# Patient Record
Sex: MALE | Race: WHITE | Employment: FULL TIME | URBAN - METROPOLITAN AREA
[De-identification: names, ages, dates, MRNs, and addresses within clinical notes are randomized per-mention and may not be internally consistent; named-entity substitution may affect disease eponyms.]

---

## 2017-06-01 ENCOUNTER — TRANSFERRED RECORDS (OUTPATIENT)
Dept: HEALTH INFORMATION MANAGEMENT | Facility: CLINIC | Age: 52
End: 2017-06-01

## 2020-04-08 ENCOUNTER — VIRTUAL VISIT (OUTPATIENT)
Dept: FAMILY MEDICINE | Facility: CLINIC | Age: 55
End: 2020-04-08
Payer: COMMERCIAL

## 2020-04-08 DIAGNOSIS — J32.9 CHRONIC SINUSITIS, UNSPECIFIED LOCATION: Primary | ICD-10-CM

## 2020-04-08 PROBLEM — Z47.89 SURGICAL AFTERCARE, MUSCULOSKELETAL SYSTEM: Status: ACTIVE | Noted: 2017-01-03

## 2020-04-08 PROCEDURE — 99213 OFFICE O/P EST LOW 20 MIN: CPT | Mod: TEL | Performed by: FAMILY MEDICINE

## 2020-04-08 RX ORDER — FEXOFENADINE HCL 180 MG/1
180 TABLET ORAL DAILY
Qty: 30 TABLET | Refills: 1 | Status: SHIPPED | OUTPATIENT
Start: 2020-04-08

## 2020-04-08 NOTE — PROGRESS NOTES
"Subjective     Aston Barnett is a 54 year old male who is being evaluated via a billable telephone visit.      The patient has been notified of following:     \"This telephone visit will be conducted via a call between you and your physician/provider. We have found that certain health care needs can be provided without the need for a physical exam.  This service lets us provide the care you need with a short phone conversation.  If a prescription is necessary we can send it directly to your pharmacy.  If lab work is needed we can place an order for that and you can then stop by our lab to have the test done at a later time.    Telephone visits are billed at different rates depending on your insurance coverage. During this emergency period, for some insurers they may be billed the same as an in-person visit.  Please reach out to your insurance provider with any questions.    If during the course of the call the physician/provider feels a telephone visit is not appropriate, you will not be charged for this service.\"    Patient has given verbal consent for Telephone visit?  Yes    Aston Barnett complains of   Chief Complaint   Patient presents with     Sinus Problem     pressure       ALLERGIES  Patient has no known allergies.    Acute Illness   Acute illness concerns: sinus problem  Onset: x3 months    Fever: no    Chills/Sweats: no    Headache (location?): no    Sinus Pressure:YES- facial pain    Conjunctivitis:  no    Ear Pain: YES: right    Rhinorrhea: YES    Congestion: YES    Sore Throat: YES     Cough: YES-non-productive    Wheeze: no    Decreased Appetite: no    Nausea: no    Vomiting: no    Diarrhea:  no    Dysuria/Freq.: no    Fatigue/Achiness: no    Sick/Strep Exposure: no     Therapies Tried and outcome: flonase.    Pt has been having congestion most of the time, in the nose, and into the chest, also clearing his throat all the time, with some sore throat.  He tried steam, or hot shower, that " helps.  Also he has cough that is dry.   Pt is doing flonase twice daily, pt went to [facility], and he was prescribed abx that helped slightly but now it came back.      Patient Active Problem List   Diagnosis     AC separation     Labral tear of shoulder     Surgical aftercare, musculoskeletal system     Osteolysis, right shoulder     History reviewed. No pertinent surgical history.    Social History     Tobacco Use     Smoking status: Never Smoker     Smokeless tobacco: Never Used   Substance Use Topics     Alcohol use: No     Family History   Problem Relation Age of Onset     Family History Negative No family hx of          Current Outpatient Medications   Medication Sig Dispense Refill     triamcinolone (KENALOG) 0.1 % cream Apply  topically 2 times daily. 30 g 0     No Known Allergies  No lab results found.     Reviewed and updated as needed this visit by Provider         Review of Systems   ROS COMP: CONSTITUTIONAL: NEGATIVE for fever, chills, change in weight  RESP: NEGATIVE for significant cough or SOB  CV: NEGATIVE for chest pain, palpitations or peripheral edema       Objective   Reported vitals:  There were no vitals taken for this visit.   healthy, alert and no distress  Psych: Alert and oriented times 3; coherent speech, normal   rate and volume, able to articulate logical thoughts, able   to abstract reason, no tangential thoughts, no hallucinations   or delusions               Assessment/Plan:  1. Chronic sinusitis, unspecified location  Unsure of it is allergic, or bacterial, will start on   - amoxicillin-clavulanate (AUGMENTIN) 875-125 MG tablet; Take 1 tablet by mouth 2 times daily  Dispense: 60 tablet; Refill: 0  - fexofenadine (ALLEGRA) 180 MG tablet; Take 1 tablet (180 mg) by mouth daily  Dispense: 30 tablet; Refill: 1    This was done as a phone visit in order to mitigate transmission of COVID-19 virus during the epidemic time, some of the medical decisions were made due to the  limitations of phone visit (lack of physical exam, inability to do blood tests and so forth)      Phone call duration:  12 minutes    Felix Leonardo MD

## 2020-04-22 ENCOUNTER — VIRTUAL VISIT (OUTPATIENT)
Dept: FAMILY MEDICINE | Facility: CLINIC | Age: 55
End: 2020-04-22
Payer: COMMERCIAL

## 2020-04-22 DIAGNOSIS — J30.9 ALLERGIC SINUSITIS: Primary | ICD-10-CM

## 2020-04-22 PROCEDURE — 99213 OFFICE O/P EST LOW 20 MIN: CPT | Mod: 95 | Performed by: FAMILY MEDICINE

## 2020-04-22 RX ORDER — PREDNISONE 20 MG/1
TABLET ORAL
Qty: 11 TABLET | Refills: 0 | Status: SHIPPED | OUTPATIENT
Start: 2020-04-22 | End: 2020-05-07

## 2020-04-22 NOTE — PROGRESS NOTES
"Aston Barnett is a 54 year old male who is being evaluated via a billable telephone visit.      The patient has been notified of following:     \"This telephone visit will be conducted via a call between you and your physician/provider. We have found that certain health care needs can be provided without the need for a physical exam.  This service lets us provide the care you need with a short phone conversation.  If a prescription is necessary we can send it directly to your pharmacy.  If lab work is needed we can place an order for that and you can then stop by our lab to have the test done at a later time.    Telephone visits are billed at different rates depending on your insurance coverage. During this emergency period, for some insurers they may be billed the same as an in-person visit.  Please reach out to your insurance provider with any questions.    If during the course of the call the physician/provider feels a telephone visit is not appropriate, you will not be charged for this service.\"    Patient has given verbal consent for Telephone visit?  Yes    How would you like to obtain your AVS? MyChart    Subjective     Aston Barnett is a 54 year old male who presents to clinic today for the following health issues:    HPI  Acute Illness   Acute illness concerns: sinus problem  Onset: January 2020    Fever: no    Chills/Sweats: no    Headache (location?): no    Sinus Pressure:YES    Conjunctivitis:  no    Ear Pain: YES- once in a while    Rhinorrhea: no    Congestion: YES- a little     Sore Throat: YES     Cough: YES - mostly dry    Wheeze: no    Decreased Appetite: no    Nausea: no    Vomiting: no    Diarrhea:  no    Dysuria/Freq.: no    Fatigue/Achiness: no    Sick/Strep Exposure: no     Therapies Tried and outcome: Allegra and Flonase    Sinus is just starting to clear, throat is still painful      He has had allergic rhinitis for years and takes allegra     BP Readings from Last 3 " Encounters:   06/08/12 122/62   07/12/07 118/76   09/01/05 116/74    Wt Readings from Last 3 Encounters:   06/08/12 80.4 kg (177 lb 3.2 oz)   07/12/07 82.9 kg (182 lb 12.8 oz)   08/25/05 78.9 kg (174 lb)                    Reviewed and updated as needed this visit by Provider         Review of Systems   ROS COMP: Constitutional, HEENT, cardiovascular, pulmonary, gi and gu systems are negative, except as otherwise noted.       Objective   Reported vitals:  There were no vitals taken for this visit.   healthy, alert and no distress  PSYCH: Alert and oriented times 3; coherent speech, normal   rate and volume, able to articulate logical thoughts, able   to abstract reason, no tangential thoughts, no hallucinations   or delusions  His affect is normal, pleasant and doesn't sound congested   RESP: No cough, no audible wheezing, able to talk in full sentences  Remainder of exam unable to be completed due to telephone visits            Assessment/Plan:  (J30.9) Allergic sinusitis  (primary encounter diagnosis)  Comment: trial of steroid   Plan: predniSONE (DELTASONE) 20 MG tablet        Complete the antibiotics he's on          Phone call duration:  14 minutes    Bimal Bautista MD

## 2020-05-04 ENCOUNTER — TELEPHONE (OUTPATIENT)
Dept: FAMILY MEDICINE | Facility: CLINIC | Age: 55
End: 2020-05-04

## 2020-05-04 NOTE — TELEPHONE ENCOUNTER
"S-(situation): sinus and throat clearing all the time    B-(background):states in [place] it started went to an [facility] was on an abx then did not help then virtual visit with Dr Leonardo on 04/08/2020 where he was prescribed Augmentin and Allegra pt finished that with little improvement.  04/22/2020 virtual visit with Dr Bautista where he was prescribed Prednisone trial.  Pt states that there was NO improvement with that.       A-(assessment): now he states he has sinus drainage throat clearing all the time \"feels like there is phlegm stuck in his throat. Dry cough sometimes he coughs a little phlegm out.  No temp. No exposure to COVID infected individuals.  Unable to look in his throat to see if red since he is color blind and no one around to look at the time.      R-(recommendations): will forward to Dr Bautista for recommendations.  Ching Dimas RN        "

## 2020-05-04 NOTE — TELEPHONE ENCOUNTER
Patient calling stating that he had a phone visit with Dr. Bautista a couple of weeks ago. States that Dr. Bautista told him to call back if not getting better. Patient stated he feels that he is getting worse and wanted to speak to a nurse. Please call patient at [phone], okay to leave message.    Georgina Morley,

## 2020-05-05 NOTE — TELEPHONE ENCOUNTER
"Called pt, informs symptoms present past couple of months, has had cough since January, denies covid19 symptoms other than cough and he has had this and \"its his regular cough\", denies fever, SOB or rash, appointment made, offered urgent care if wants earlier appointment, pt okay to wait for clinic appointment  Ellen Aguero RN, BSN  Message handled by Nurse Triage.    "

## 2020-05-05 NOTE — TELEPHONE ENCOUNTER
Given lack of improvement with antibiotics and steroids, would recommend face to face visit.  If not able to get in, would recommend video visit at the very least.

## 2020-05-07 ENCOUNTER — OFFICE VISIT (OUTPATIENT)
Dept: FAMILY MEDICINE | Facility: CLINIC | Age: 55
End: 2020-05-07
Payer: COMMERCIAL

## 2020-05-07 VITALS
HEART RATE: 79 BPM | WEIGHT: 187.2 LBS | TEMPERATURE: 98 F | BODY MASS INDEX: 26.67 KG/M2 | DIASTOLIC BLOOD PRESSURE: 75 MMHG | SYSTOLIC BLOOD PRESSURE: 114 MMHG | OXYGEN SATURATION: 99 %

## 2020-05-07 DIAGNOSIS — J32.0 CHRONIC MAXILLARY SINUSITIS: Primary | ICD-10-CM

## 2020-05-07 PROCEDURE — 99214 OFFICE O/P EST MOD 30 MIN: CPT | Performed by: PHYSICIAN ASSISTANT

## 2020-05-07 RX ORDER — PREDNISONE 20 MG/1
TABLET ORAL
Qty: 15 TABLET | Refills: 0 | Status: SHIPPED | OUTPATIENT
Start: 2020-05-07

## 2020-05-07 RX ORDER — FLUTICASONE PROPIONATE 50 MCG
1 SPRAY, SUSPENSION (ML) NASAL DAILY
COMMUNITY

## 2020-05-07 RX ORDER — DOXYCYCLINE 100 MG/1
100 CAPSULE ORAL 2 TIMES DAILY
Qty: 28 CAPSULE | Refills: 0 | Status: SHIPPED | OUTPATIENT
Start: 2020-05-07 | End: 2020-05-21

## 2020-05-07 NOTE — PROGRESS NOTES
Subjective     Aston Barnett is a 54 year old male who presents to clinic today for the following health issues:    HPI   Acute Illness   Acute illness concerns: Sinus  Onset: January     Fever: no    Chills/Sweats: no    Headache (location?): no    Sinus Pressure:YES- a little    Conjunctivitis:  no    Ear Pain: YES- once in a great while    Rhinorrhea: no    Congestion: YES- very upper chest    Sore Throat: YES- a little      Cough: YES    Wheeze: no    Decreased Appetite: no    Nausea: no    Vomiting: no    Diarrhea:  no    Dysuria/Freq.: no    Fatigue/Achiness: no    Sick/Strep Exposure: no     Therapies Tried and outcome: Allegra helps a little but the amoxicillin does not help.  Hot showers and activity help. Also taking flonase and took short course of doxy at beginning of April with mild improvement.       Patient states has occurred in the past and took a while to improve.       Patient Active Problem List   Diagnosis     AC separation     Labral tear of shoulder     Surgical aftercare, musculoskeletal system     Osteolysis, right shoulder     History reviewed. No pertinent surgical history.    Social History     Tobacco Use     Smoking status: Never Smoker     Smokeless tobacco: Never Used   Substance Use Topics     Alcohol use: No     Family History   Problem Relation Age of Onset     Colon Cancer Father      Family History Negative No family hx of          Current Outpatient Medications   Medication Sig Dispense Refill     doxycycline monohydrate (MONODOX) 100 MG capsule Take 1 capsule (100 mg) by mouth 2 times daily for 14 days 28 capsule 0     fluticasone (FLONASE) 50 MCG/ACT nasal spray Spray 1 spray into both nostrils daily       predniSONE (DELTASONE) 20 MG tablet Take 2 tabs (40 mg) daily for 5 days then 1 tab (20 mg) daily for 5 days. 15 tablet 0     fexofenadine (ALLEGRA) 180 MG tablet Take 1 tablet (180 mg) by mouth daily 30 tablet 1     Allergies   Allergen Reactions     Procaine Nausea      No lab results found.   BP Readings from Last 3 Encounters:   05/07/20 114/75   06/08/12 122/62   07/12/07 118/76    Wt Readings from Last 3 Encounters:   05/07/20 84.9 kg (187 lb 3.2 oz)   06/08/12 80.4 kg (177 lb 3.2 oz)   07/12/07 82.9 kg (182 lb 12.8 oz)                    Reviewed and updated as needed this visit by Provider         Review of Systems   ROS COMP: Constitutional, HEENT, cardiovascular, pulmonary, gi and gu systems are negative, except as otherwise noted.      Objective    /75 (BP Location: Right arm, Patient Position: Chair, Cuff Size: Adult Large)   Pulse 79   Temp 98  F (36.7  C) (Oral)   Wt 84.9 kg (187 lb 3.2 oz)   SpO2 99%   BMI 26.67 kg/m    Body mass index is 26.67 kg/m .  Physical Exam   GENERAL: healthy, alert and no distress  EYES: Eyes grossly normal to inspection, PERRL and conjunctivae and sclerae normal  HENT: normal cephalic/atraumatic, both ears: clear effusion, nasal mucosa edematous , oropharynx clear and oral mucous membranes moist  NECK: no adenopathy, no asymmetry, masses, or scars and thyroid normal to palpation  RESP: lungs clear to auscultation - no rales, rhonchi or wheezes  CV: regular rate and rhythm, normal S1 S2, no S3 or S4, no murmur, click or rub, no peripheral edema and peripheral pulses strong  PSYCH: mentation appears normal, affect normal/bright    Diagnostic Test Results:  none         Assessment & Plan     (J32.0) Chronic maxillary sinusitis  (primary encounter diagnosis)  Comment: ongoing symptoms. Research shows chronic sinusitis without polyps respond to prolonged course of steroids with abx. No noted polyps seen, but still a possibility. Will tx with combo treatment and referred to ENT for second opinion. Continue flonase and allegra. May stop augmentin. Recommending otc probiotic as well.   Plan: predniSONE (DELTASONE) 20 MG tablet,         doxycycline monohydrate (MONODOX) 100 MG         capsule, OTOLARYNGOLOGY REFERRAL        -Medication  use and side effects discussed with the patient. Patient is in complete understanding and agreement with plan.              Return in about 6 months (around 11/7/2020) for Physical Exam.    Héctor Lares PA-C  [facility]

## 2020-05-07 NOTE — Clinical Note
Please abstract the following data from this visit with this patient into the appropriate field in Epic:    Tests that can be patient reported without a hard copy:    Colonoscopy done on this date: June of 2017 (approximately), by this group: unknown, results were normal. Q 5 years given family hx.     Other Tests found in the patient's chart through Chart Review/Care Everywhere:

## 2020-05-07 NOTE — PATIENT INSTRUCTIONS
Patient Education     Chronic Sinusitis  Chronic sinusitis is a long-term swelling or infection of the sinuses. If sinusitis lasts more than 12 weeks, it is called chronic.    What is chronic sinusitis?  Sinuses are air-filled spaces in the skull behind the face. They are kept moist and clean by a lining of mucosa. Things such as pollen, smoke, and chemical fumes can irritate the mucosa. Constant exposure to irritants can cause ongoing inflammation of this lining. It can also damage tiny hairlike cilia that cover the mucosa. Cilia help carry mucus toward the opening of the sinus. Damage to cilia keeps mucus from draining from the sinuses.  Causes of chronic sinusitis  Problems that irritate the mucosa or block drainage can lead to chronic sinusitis. These may include:    Infections    Chronic allergies    Nasal polyps, deviated septum, or other blockages    Constant exposure to irritants, such as cigarette smoke or fumes    Asthma    Acute sinusitis that keeps coming back  Common symptoms of chronic sinusitis  Symptoms may include:    Facial pain and pressure    Headache and sinus pain    Nasal congestion    Thick, colored drainage from the nose    Thick mucus draining down the back of the throat (postnasal drainage)    Loss of smell    Fever    Cough    Sore throat  Diagnosing chronic sinusitis  Your doctor will ask about your symptoms and health history. The doctor will look at your nose and face. You may have imaging studies such as an X-ray or CT scan of the sinuses. Your doctor may also take a sample of the drainage to check for bacteria. You may also have an endoscopy. During this test, the doctor puts a lighted tube through your nose up into your sinuses to look at the sinuses.  Treating chronic sinusitis  Treatment involves reducing irritation and inflammation. Your plan may include:    Taking medicines. Your doctor may prescribe medicines to reduce the amount of mucus and swelling. These help unblock the  sinuses and allow them to drain. You will need to take antibiotics if you have a bacterial infection.    Flushing your sinuses. Your doctor may suggest sinus irrigation. This is flushing your sinuses with saltwater or saline solution. This helps to clear out mucus.    Controlling allergies. If you have allergies, you should have a plan to help control them. This plan may include medicines or allergy shots.    Having surgery. In some cases, you may need surgery on the nose, sinuses, or both. This can improve sinus drainage or remove nasal blockages.  Date Last Reviewed: 10/1/2016    0960-4694 [employer]. [address] [place] [address], [place] [address]. All rights reserved. This information is not intended as a substitute for professional medical care. Always follow your healthcare professional's instructions.

## 2021-01-15 ENCOUNTER — HEALTH MAINTENANCE LETTER (OUTPATIENT)
Age: 56
End: 2021-01-15

## 2021-10-24 ENCOUNTER — HEALTH MAINTENANCE LETTER (OUTPATIENT)
Age: 56
End: 2021-10-24

## 2022-02-13 ENCOUNTER — HEALTH MAINTENANCE LETTER (OUTPATIENT)
Age: 57
End: 2022-02-13

## 2022-10-16 ENCOUNTER — HEALTH MAINTENANCE LETTER (OUTPATIENT)
Age: 57
End: 2022-10-16

## 2023-03-26 ENCOUNTER — HEALTH MAINTENANCE LETTER (OUTPATIENT)
Age: 58
End: 2023-03-26